# Patient Record
Sex: MALE
[De-identification: names, ages, dates, MRNs, and addresses within clinical notes are randomized per-mention and may not be internally consistent; named-entity substitution may affect disease eponyms.]

---

## 2023-05-03 ENCOUNTER — HOSPITAL ENCOUNTER (EMERGENCY)
Dept: HOSPITAL 97 - ER | Age: 53
Discharge: HOME | End: 2023-05-03
Payer: SELF-PAY

## 2023-05-03 VITALS — OXYGEN SATURATION: 97 % | SYSTOLIC BLOOD PRESSURE: 129 MMHG | DIASTOLIC BLOOD PRESSURE: 93 MMHG

## 2023-05-03 VITALS — TEMPERATURE: 98.1 F

## 2023-05-03 DIAGNOSIS — S43.004A: Primary | ICD-10-CM

## 2023-05-03 PROCEDURE — 99284 EMERGENCY DEPT VISIT MOD MDM: CPT

## 2023-05-03 PROCEDURE — 96374 THER/PROPH/DIAG INJ IV PUSH: CPT

## 2023-05-03 PROCEDURE — 96375 TX/PRO/DX INJ NEW DRUG ADDON: CPT

## 2023-05-03 PROCEDURE — 0RSJXZZ REPOSITION RIGHT SHOULDER JOINT, EXTERNAL APPROACH: ICD-10-PCS

## 2023-05-03 NOTE — RAD REPORT
EXAM DESCRIPTION:  XR Right Shoulder Complete, 2 or More Views

 

CLINICAL HISTORY:  The patient is 52 years old and is Male; PAIN

 

TECHNIQUE:  Two views of the right shoulder.

 

COMPARISON:  No relevant prior studies available.

 

FINDINGS:  Bones/joints:   Anterior dislocation right glenohumeral joint. Hill-Sachs fracture of unde
termined age.

        Mild degenerative changes in the right acromioclavicular joint.

  Soft tissues:   Unremarkable.

 

IMPRESSION:  1.   Anterior dislocation, right shoulder.

2.   Hill-Sachs fracture of undetermined age.

 

Electronically signed by:   Winifred Vaca MD   5/3/2023 4:54 AM CDT Workstation: [id number]

 

 

Due to temporary technical issues with the PACS/Fluency reporting system, reports are being signed by
 the in house radiologist without review as a courtesy to ensure prompt reporting. The interpreting r
adiologist is fully responsible for the content of the report.

## 2023-05-03 NOTE — RAD REPORT
EXAM DESCRIPTION:  XR Right Shoulder Complete, 2 or More Views

 

CLINICAL HISTORY:  The patient is 52 years old and is Male; post reduction

 

TECHNIQUE:  Two views of the right shoulder.

 

COMPARISON:  May 3, 2023 4:08 AM

 

FINDINGS:  Bones/joints:   Anterior right shoulder dislocation is reduced. Small Hill-Sachs fracture 
of undetermined age.

        Mild AC joint arthropathy.

  Soft tissues:   Unremarkable.

 

IMPRESSION:  Anterior right shoulder dislocation is reduced. Small Hill-Sachs fracture of undetermine
d age.

 

Electronically signed by:   Winifred Vaca MD   5/3/2023 5:17 AM CDT Workstation: [id number]

 

 

 

Due to temporary technical issues with the PACS/Fluency reporting system, reports are being signed by
 the in house radiologist without review as a courtesy to ensure prompt reporting. The interpreting r
adiologist is fully responsible for the content of the report.

## 2023-05-03 NOTE — XMS REPORT
Continuity of Care Document

                             Created on:May 3, 2023



Patient:JOSÉ MANUEL MALONE ISRIELLE

Sex:Male

:1970

External Reference #:[id number]





Demographics







                          Address                   [address]



                                                    [place] [address]

 

                          Home Phone                [phone] CELL

 

                          Work Phone                [phone] CELL

 

                          Mobile Phone              [phone]

 

                          Email Address             NO EMAIL

 

                          Preferred Language        spa

 

                          Marital Status            Unknown

 

                          Ep[employer] Affiliation     Unknown

 

                          Race                      Unknown

 

                          Additional Race(s)        Unavailable



                                                    White

 

                          Ethnic Group              Unknown









Author







                          Organization              [facility]

t

 

                          Address                   [address] [place] [address] [address] [place] [id number]

 

                          Phone                     [phone]









Support







                Name            Relationship    Address         Phone

 

                SHIVANI MOMIN E ZOE  Unavailable



                                                [place] [address] 









Care Team Providers







                    Name                Role                Phone

 

                    PCP, PATIENT DOES NOT HAVE A Primary Care Physician KIRAN Bridges          Attending Clinician Unavailable

 

                    Jose Antonio    Attending Clinician Unavailable

 

                    Kiran Cummins        Attending Clinician [phone]

 

                    MALICK BRAVO        Attending Clinician Unavailable

 

                    KIRAN CUMMINS          Admitting Clinician Unavailable

 

                    Jose Antonio    Admitting Clinician Unavailable









Payers







           Payer Name Policy Type Policy Number Effective Date Expiration Date S

ourellen

 

           0191                  [id number] 1960            



                                            00:00:00              

 

           [employer]            [id number] 2022            



           [facility]                            00:00:00              

 

           COMMERCIAL            [id number] 2022            



           [employer]                       00:00:00              



           GENERIC                                                







Problems







       Condition Condition Condition Status Onset  Resolution Last   Treating Co

mments 

Source



       Name   Details Category        Date   Date   Treatment Clinician        



                                                 Date                 

 

       Bilateral Bilateral Problem Active                              Aza

kenia



       osteoarthr Osteoarthr                                              Or

thope



       itis of itis of               00:00:                             dic



       knees  Knees                00                                 Sports



                                                                      [facility]



                                                                      e







Allergies, Adverse Reactions, Alerts







       Allergy Allergy Status Severity Reaction(s) Onset  Inactive Treating Comm

ents 

Source



       Name   Type                        Date   Date   Clinician        

 

       NO KNOWN Drug   Active                                           [facility]



       ALLERGIE Class                                                   ity of



       [facility]







Medications







       Ordered Filled Start  Stop   Current Ordering Indication Dosage Frequency

 Signature

                    Comments            Components          Source



     Medication Medication Date Date Medication? Clinician                (SIG) 

          



     Name Name                                                   

 

     ID NOW ID NOW           No                       ID NOW           [facility]



     COVID-19 COVID-19                                    COVID-19           Ort

hope



     Test Kit Test Kit                                    Test Kit           dic



     TEST AS TEST AS                                    TEST AS           Sports



     DIRECTED DIRECTED                                    DIRECTED           Med

icin



     TODAY TODAY                                    TODAY           e







Procedures







                Procedure       Date / Time Performed Performing Clinician [facility]

e

 

                XR, knee, 3 view 2022 00:00:00                 [facility]

opedic Sports



                                                                Medicine







Encounters







        Start   End     Encounter Admission Attending Care    Care    Encounter 

Source



        Date/Time Date/Time Type    Type    Clinicians Facility Department ID   

   

 

        2022 2022-10-21 Outpatient KIRAN BASS [facility]     PT      [address]

[id number] [facility]



        14:55:00 23:59:00                                                 Medica

[facility]

 

        2022 Outpatient         FOG_Lyu_Kev AOSM    AOSM    [address]

[id number] [facility]



        00:00:00 00:00:00                 Arelis                   [id number]  Orthop

e



                                                                        dic



                                                                        Sports



                                                                        Medicin



                                                                        e

 

        2022 Outpatient         FOG_Lyu_Kev AOSM    AOSM    [address]

[id number] Rosa



        00:00:00 00:00:00                 Arelis                   [id number]  Orthop

e



                                                                        dic



                                                                        Sports



                                                                        Medicin



                                                                        e

 

        2022 Kiran BOSE                 AOSM    [place] - Ortho [id number]

8 Rosa



        00:00:00 00:00:00 MD Marcos:                         Brittney slade



                        [id number]                           FOG[address]Of[address]         dic



                        [facility]

[facility]                                         [facility]



                        [address]                                         e



                        [place], [place]                                         



                        [address]                                         



                        , Ph.                                           



                        [phone]                                         

 

        2022 Outpatient         [facility]

[id number] 



        00:00:00 00:00:00                 W                       [id number] 



                                                                [id number] 



                                                                [id number]  

 

        2022 Outpatient         FOG_Lyu_Kev AOSM    AO    [address]

[id number] [facility]



        00:00:00 00:00:00                 Arelis                   [id number]  Orthop

e



                                                                        dic



                                                                        Sports



                                                                        Medicin



                                                                        e

 

        2022 Outpatient KAMAR BRAVO   [facility]    [id number]

[id number] [facility]



        20:40:00 20:45:30                 MALICK lipscomb [facility]







Results

This patient has no known results.

## 2023-05-03 NOTE — EDPHYS
Physician Documentation                                                                           

 [facility]                                                                 

Name: Bala Salter                                                                               

Age: 52 yrs                                                                                       

Sex: Male                                                                                         

: 1970                                                                                   

MRN: [id number]                                                                                   

Arrival Date: 2023                                                                          

Time: 03:03                                                                                       

Account#: [id number]                                                                            

Bed 6                                                                                             

Private MD:                                                                                       

ED Physician Kyle Barnard                                                                     

HPI:                                                                                              

                                                                                             

05:16 This 52 yrs old  Male presents to ER via Ambulatory with complaints of Shoulder rt  

      Pain, Neck and Upper Back Pain.                                                             

05:16 The patient or guardian complains of deformity. Patient presents to the ED with a       rt  

      dislocation to the right shoulder. This happened 2 times previously. This occurred          

      overnight when he rolled over. He does report pain that is aching in nature,                

      nonradiating to that region. Denies other acute complaints at this time. Symptoms are       

      moderate severity, no other aggravating alleviating factors..                               

                                                                                                  

Historical:                                                                                       

- Allergies:                                                                                      

03:36 No Known Allergies;                                                                     vc1 

- Home Meds:                                                                                      

03:36 None [Active];                                                                          vc1 

- PMHx:                                                                                           

03:36 None;                                                                                   vc1 

- PSHx:                                                                                           

03:36 None;                                                                                   vc1 

                                                                                                  

- Immunization history:: Client reports receiving the 2nd dose of the Covid vaccine.              

- Social history:: Smoking status: Patient denies any tobacco usage or history of.                

- Family history:: not pertinent.                                                                 

                                                                                                  

                                                                                                  

ROS:                                                                                              

05:16 Constitutional: Negative for fever, chills, and weight loss, Cardiovascular: Negative   rt  

      for chest pain, palpitations, and edema, Respiratory: Negative for shortness of breath,     

      cough, wheezing, and pleuritic chest pain, Abdomen/GI: Negative for abdominal pain,         

      nausea, vomiting, diarrhea, and constipation, Skin: Negative for injury, rash, and          

      discoloration, Neuro: Negative for headache, weakness, numbness, tingling, and seizure,     

      Psych: Negative for depression, anxiety, suicide ideation, homicidal ideation, and          

      hallucinations.                                                                             

05:16 MS/extremity: Positive for injury or acute deformity, pain.                                 

                                                                                                  

Exam:                                                                                             

05:16 Constitutional:  This is a well developed, well nourished patient who is awake, alert,  rt  

      and in no acute distress. Head/Face:  Normocephalic, atraumatic. Chest/axilla:  Normal      

      chest wall appearance and motion.  Nontender with no deformity.  No lesions are             

      appreciated. Cardiovascular:  Regular rate and rhythm with a normal S1 and S2.  No          

      gallops, murmurs, or rubs.  Normal PMI, no JVD.  No pulse deficits. Respiratory:  Lungs     

      have equal breath sounds bilaterally, clear to auscultation and percussion.  No rales,      

      rhonchi or wheezes noted.  No increased work of breathing, no retractions or nasal          

      flaring. Abdomen/GI:  Soft, non-tender, with normal bowel sounds.  No distension or         

      tympany.  No guarding or rebound.  No evidence of tenderness throughout.                    

05:16 Musculoskeletal/extremity: Obvious deformity noted to the right shoulder with               

      tenderness to that region, no other swelling, deformities, pulses, motor, sensation         

      intact..                                                                                    

                                                                                                  

Vital Signs:                                                                                      

03:32  / 78; Pulse 60; Resp 17; Temp 98.1; Pulse Ox 100% ; Weight 76.66 kg; Pain 10/10; vc1 

05:01  / 93; Pulse 75; Resp 16; Pulse Ox 97% on R/A;                                    jb4 

03:32 Pain Scale: Adult                                                                       vc1 

                                                                                                  

Procedures:                                                                                       

05:16 Reduction: of the right shoulder, using traction, External rotation, Immobilized with   rt  

      sling, Patient tolerated well. Post reduction film - reveals normal alignment. Moderate     

      sedation: Pre-procedure assessment: the patient has been NPO 6 hour(s) prior to             

      arrival, ASA physical classification: I - healthy, no underlying organic disease,           

      Airway assessment: able to hyperextend neck, able to maintain airway, can open mouth        

      without difficulty, Mallampati classification of tongue size: I - faucial pillars, soft     

      palate, and uvula can be fully visualized, Monitoring during procedure: cardiac             

      monitor, continuous pulse oximetry, nurse at bedside at all times, Medications              

      employed: Ketamine, 75 mg(s), Post-procedure assessment: the patient is not sedated,        

      Pina sedation score: 1 - patient anxious or agitated or both, Respiratory status:         

      even and unlabored, a reversal agent was not used.                                          

                                                                                                  

MDM:                                                                                              

03:28 Patient medically screened.                                                             rt  

05:16 Differential diagnosis: Fracture, dislocation, arthritis. Data reviewed: vital signs,   rt  

      nurses notes, radiologic studies. Independent interpretation of the following test(s)       

      in the Emergency Department X-Ray: My interpretation is Dislocation noted on my             

      interpretation of the initial x-ray image, normal alignment on repeat film..                

      Counseling: I had a detailed discussion with the patient and/or guardian regarding: the     

      historical points, exam findings, and any diagnostic results supporting the                 

      discharge/admit diagnosis, radiology results, the need for outpatient follow up.            

      Response to treatment: the patient's symptoms have resolved after treatment.                

                                                                                                  

                                                                                             

03:32 Order name: Shoulder Right (2 View) XRAY                                                rt  

                                                                                             

04:46 Order name: Shoulder Right (2 View) XRAY                                                rt  

                                                                                                  

Administered Medications:                                                                         

03:43 Drug: Ketorolac IVP 30 mg Route: IVP; Site: left antecubital;                           jb4 

04:10 Follow up: Response: No adverse reaction; Marked relief of symptoms                     jb4 

04:40 Drug: Ketamine IVP 75 mg Route: IVP; Site: left antecubital;                            jb4 

05:01 Follow up: Response: No adverse reaction; Marked relief of symptoms                     jb4 

                                                                                                  

                                                                                                  

Disposition Summary:                                                                              

23 05:09                                                                                    

Discharge Ordered                                                                                 

      Location: Home                                                                          rt  

      Problem: an acute exacerbation                                                          rt  

      Symptoms: are resolved                                                                  rt  

      Condition: Stable                                                                       rt  

      Diagnosis                                                                                   

        - Pain in right shoulder                                                              rt  

      Followup:                                                                               rt  

        - With: Omar Aragon MD                                                                  

        - When: 5 - 6 days                                                                         

        - Reason:                                                                                  

      Discharge Instructions:                                                                     

        - Discharge Summary Sheet                                                             rt  

        - Shoulder Dislocation                                                                rt  

        - Moderate Conscious Sedation, Adult, Care After                                      rt  

      Forms:                                                                                      

        - Medication Reconciliation Form                                                      rt  

        - Thank You Letter                                                                    rt  

        - Antibiotic Education                                                                rt  

        - Prescription Opioid Use                                                             rt  

Signatures:                                                                                       

Dispatcher [employer]                           Fernando Ballard RN                       RN   jb4                                                  

Amanda Luna RN                    RN   vc1                                                  

Kyle Barnard MD MD   rt                                                   

                                                                                                  

**************************************************************************************************

## 2023-05-03 NOTE — ER
Nurse's Notes                                                                                     

 [facility]                                                                 

Name: Bala Salter                                                                               

Age: 52 yrs                                                                                       

Sex: Male                                                                                         

: 1970                                                                                   

MRN: [id number]                                                                                   

Arrival Date: 2023                                                                          

Time: 03:03                                                                                       

Account#: [id number]                                                                            

Bed 6                                                                                             

Private MD:                                                                                       

Diagnosis: Pain in right shoulder                                                                 

                                                                                                  

Presentation:                                                                                     

                                                                                             

03:32 Chief complaint: Patient states: "I rolled over and felt my arm pop. It has done this a vc1 

      few times before". Coronavirus screen: Vaccine status: Patient reports receiving the        

      2nd dose of the covid vaccine. Moderna At this time, the client does not indicate any       

      symptoms associated with coronavirus-19. Ebola Screen: Patient negative for fever           

      greater than or equal to 101.5 degrees Fahrenheit, and additional compatible Ebola          

      Virus Disease symptoms Patient denies exposure to infectious person. Patient denies         

      travel to an Ebola-affected area in the 21 days before illness onset. No symptoms or        

      risks identified at this time. Initial Sepsis Screen: Does the patient meet any 2           

      criteria? No. Patient's initial sepsis screen is negative. Does the patient have a          

      suspected source of infection? No. Patient's initial sepsis screen is negative. Risk        

      Assessment: Do you want to hurt yourself or someone else? Patient reports no desire to      

      harm self or others. Onset of symptoms was May 03, 2023 at 02:50.                           

03:32 Method Of Arrival: Ambulatory                                                           vc1 

03:32 Acuity: RASHI 3                                                                           vc1 

                                                                                                  

Historical:                                                                                       

- Allergies:                                                                                      

03:36 No Known Allergies;                                                                     vc1 

- Home Meds:                                                                                      

03:36 None [Active];                                                                          vc1 

- PMHx:                                                                                           

03:36 None;                                                                                   vc1 

- PSHx:                                                                                           

03:36 None;                                                                                   vc1 

                                                                                                  

- Immunization history:: Client reports receiving the 2nd dose of the Covid vaccine.              

- Social history:: Smoking status: Patient denies any tobacco usage or history of.                

- Family history:: not pertinent.                                                                 

                                                                                                  

                                                                                                  

Screenin:38 [facility] ED Fall Risk Assessment (Adult) History of falling in the last 3 months,       vc1 

      including since admission No falls in past 3 months (0 pts) Confusion or Disorientation     

      No (0 pts) Intoxicated or Sedated Yes (3 pts) Impaired Gait No (0 pts) Mobility Assist      

      Device Used No (0 pt) Altered Elimination No (0 pt) Score/Fall Risk Level 0 - 2 = Low       

      Risk Oriented to surroundings, Maintained a safe environment, Educated pt \T\ family on     

      fall prevention, incl call for assistance when getting out of bed. Abuse screen: Denies     

      threats or abuse. Nutritional screening: No deficits noted. Tuberculosis screening: No      

      symptoms or risk factors identified.                                                        

                                                                                                  

Assessment:                                                                                       

03:48 General: Appears in no apparent distress. comfortable, Behavior is calm, cooperative,   jb4 

      agitated. Pain: Complains of pain in anterior aspect of right shoulder Pain does not        

      radiate. Pain currently is 10 out of 10 on a pain scale. Neuro: Level of Consciousness      

      is awake, alert, obeys commands. Cardiovascular: Patient's skin is warm and dry.            

      Respiratory: Airway is patent Respiratory effort is even, unlabored, Respiratory            

      pattern is regular, symmetrical. GI: No signs and/or symptoms were reported involving       

      the gastrointestinal system. : No signs and/or symptoms were reported regarding the       

      genitourinary system. EENT: No signs and/or symptoms were reported regarding the EENT       

      system. Derm: Skin is intact, Skin is pink, warm \T\ dry.                                   

05:01 Reassessment: Patient appears in no apparent distress at this time. Patient and/or      jb4 

      family updated on plan of care and expected duration. Pain level reassessed. Patient is     

      alert, oriented x 3, equal unlabored respirations, skin warm/dry/pink.                      

                                                                                                  

Vital Signs:                                                                                      

03:32  / 78; Pulse 60; Resp 17; Temp 98.1; Pulse Ox 100% ; Weight 76.66 kg; Pain 10/10; vc1 

05:01  / 93; Pulse 75; Resp 16; Pulse Ox 97% on R/A;                                    jb4 

03:32 Pain Scale: Adult                                                                       vc1 

                                                                                                  

ED Course:                                                                                        

03:04 Patient arrived in ED.                                                                  jj6 

03:11 Kyle Barnard MD is Attending Physician.                                            rt  

03:36 Triage completed.                                                                       vc1 

03:36 Inserted saline lock: 18 gauge in left antecubital area, using aseptic technique.       ds4 

03:48 Fernando Briscoe, RN is Primary Nurse.                                                     jb4 

03:52 Arm band placed on left wrist.                                                          vc1 

04:14 Shoulder Right (2 View) XRAY In Process Unspecified.                                    EDMS

05:04 Shoulder Right (2 View) XRAY In Process Unspecified.                                    EDMS

05:08 Omar Aragon MD is Referral Physician.                                                rt  

05:28 No provider procedures requiring assistance completed. IV discontinued, intact,         jb4 

      bleeding controlled, No redness/swelling at site. Pressure dressing applied.                

                                                                                                  

Administered Medications:                                                                         

03:43 Drug: Ketorolac IVP 30 mg Route: IVP; Site: left antecubital;                           jb4 

04:10 Follow up: Response: No adverse reaction; Marked relief of symptoms                     jb4 

04:40 Drug: Ketamine IVP 75 mg Route: IVP; Site: left antecubital;                            jb4 

05:01 Follow up: Response: No adverse reaction; Marked relief of symptoms                     jb4 

                                                                                                  

                                                                                                  

Outcome:                                                                                          

05:09 Discharge ordered by MD.                                                                rt  

05:28 Discharged to home ambulatory, with friend.                                             jb4 

05:28 Condition: stable                                                                           

05:28 Discharge instructions given to patient, Instructed on discharge instructions, follow       

      up and referral plans. Demonstrated understanding of instructions, follow-up care.          

05:29 Patient left the ED.                                                                    jb4 

                                                                                                  

Signatures:                                                                                       

Dispatcher [employer]                           EDMS                                                 

Heriberto Byers                             ds4                                                  

Fernando Briscoe RN                       RN   jb4                                                  

Dina Banerjeej6                                                  

Amanda Luna RN                    RN   vc1                                                  

Kyle Barnard MD MD   rt                                                   

                                                                                                  

**************************************************************************************************